# Patient Record
Sex: FEMALE | Race: WHITE
[De-identification: names, ages, dates, MRNs, and addresses within clinical notes are randomized per-mention and may not be internally consistent; named-entity substitution may affect disease eponyms.]

---

## 2019-08-15 ENCOUNTER — HOSPITAL ENCOUNTER (EMERGENCY)
Dept: HOSPITAL 63 - ER | Age: 34
LOS: 1 days | Discharge: HOME | End: 2019-08-16
Payer: OTHER GOVERNMENT

## 2019-08-15 VITALS — HEIGHT: 62 IN | WEIGHT: 115 LBS | BODY MASS INDEX: 21.16 KG/M2

## 2019-08-15 DIAGNOSIS — Z88.2: ICD-10-CM

## 2019-08-15 DIAGNOSIS — K59.00: ICD-10-CM

## 2019-08-15 DIAGNOSIS — Z88.1: ICD-10-CM

## 2019-08-15 DIAGNOSIS — Z87.442: ICD-10-CM

## 2019-08-15 DIAGNOSIS — N39.0: Primary | ICD-10-CM

## 2019-08-15 DIAGNOSIS — Z98.890: ICD-10-CM

## 2019-08-15 DIAGNOSIS — N23: ICD-10-CM

## 2019-08-15 DIAGNOSIS — Z87.440: ICD-10-CM

## 2019-08-15 DIAGNOSIS — Z88.5: ICD-10-CM

## 2019-08-15 LAB
ALBUMIN SERPL-MCNC: 3.6 G/DL (ref 3.4–5)
ALBUMIN/GLOB SERPL: 0.9 {RATIO} (ref 1–1.7)
ALP SERPL-CCNC: 53 U/L (ref 46–116)
ALT SERPL-CCNC: 20 U/L (ref 14–59)
AMPHETAMINE/METHAMPHETAMINE: (no result)
ANION GAP SERPL CALC-SCNC: 6 MMOL/L (ref 6–14)
APTT PPP: YELLOW S
AST SERPL-CCNC: 16 U/L (ref 15–37)
BACTERIA #/AREA URNS HPF: (no result) /HPF
BARBITURATES UR-MCNC: (no result) UG/ML
BASOPHILS # BLD AUTO: 0 X10^3/UL (ref 0–0.2)
BASOPHILS NFR BLD: 0 % (ref 0–3)
BENZODIAZ UR-MCNC: (no result) UG/L
BILIRUB SERPL-MCNC: 0.1 MG/DL (ref 0.2–1)
BILIRUB UR QL STRIP: (no result)
BUN/CREAT SERPL: 16 (ref 6–20)
CA-I SERPL ISE-MCNC: 14 MG/DL (ref 7–20)
CALCIUM SERPL-MCNC: 9 MG/DL (ref 8.5–10.1)
CANNABINOIDS UR-MCNC: (no result) UG/L
CHLORIDE SERPL-SCNC: 104 MMOL/L (ref 98–107)
CO2 SERPL-SCNC: 30 MMOL/L (ref 21–32)
COCAINE UR-MCNC: (no result) NG/ML
CREAT SERPL-MCNC: 0.9 MG/DL (ref 0.6–1)
EOSINOPHIL NFR BLD: 0.1 X10^3/UL (ref 0–0.7)
EOSINOPHIL NFR BLD: 1 % (ref 0–3)
ERYTHROCYTE [DISTWIDTH] IN BLOOD BY AUTOMATED COUNT: 12.4 % (ref 11.5–14.5)
FIBRINOGEN PPP-MCNC: CLEAR MG/DL
GFR SERPLBLD BASED ON 1.73 SQ M-ARVRAT: 72.1 ML/MIN
GLOBULIN SER-MCNC: 4.1 G/DL (ref 2.2–3.8)
GLUCOSE SERPL-MCNC: 118 MG/DL (ref 70–99)
GLUCOSE UR STRIP-MCNC: (no result) MG/DL
HCT VFR BLD CALC: 39.9 % (ref 36–47)
HGB BLD-MCNC: 13.5 G/DL (ref 12–15.5)
LYMPHOCYTES # BLD: 2.6 X10^3/UL (ref 1–4.8)
LYMPHOCYTES NFR BLD AUTO: 47 % (ref 24–48)
MCH RBC QN AUTO: 31 PG (ref 25–35)
MCHC RBC AUTO-ENTMCNC: 34 G/DL (ref 31–37)
MCV RBC AUTO: 91 FL (ref 79–100)
METHADONE SERPL-MCNC: (no result) NG/ML
MONO #: 0.3 X10^3/UL (ref 0–1.1)
MONOCYTES NFR BLD: 6 % (ref 0–9)
NEUT #: 2.6 X10^3UL (ref 1.8–7.7)
NEUTROPHILS NFR BLD AUTO: 46 % (ref 31–73)
NITRITE UR QL STRIP: (no result)
OPIATES UR-MCNC: (no result) NG/ML
PCP SERPL-MCNC: (no result) MG/DL
PLATELET # BLD AUTO: 216 X10^3/UL (ref 140–400)
POTASSIUM SERPL-SCNC: 3.6 MMOL/L (ref 3.5–5.1)
PROT SERPL-MCNC: 7.7 G/DL (ref 6.4–8.2)
RBC # BLD AUTO: 4.39 X10^6/UL (ref 3.5–5.4)
RBC #/AREA URNS HPF: 0 /HPF (ref 0–2)
SODIUM SERPL-SCNC: 140 MMOL/L (ref 136–145)
SP GR UR STRIP: 1.01
SQUAMOUS #/AREA URNS LPF: (no result) /LPF
U PREG PATIENT: NEGATIVE
UROBILINOGEN UR-MCNC: 0.2 MG/DL
WBC # BLD AUTO: 5.6 X10^3/UL (ref 4–11)
WBC #/AREA URNS HPF: (no result) /HPF (ref 0–4)

## 2019-08-15 PROCEDURE — 96365 THER/PROPH/DIAG IV INF INIT: CPT

## 2019-08-15 PROCEDURE — 81025 URINE PREGNANCY TEST: CPT

## 2019-08-15 PROCEDURE — 80307 DRUG TEST PRSMV CHEM ANLYZR: CPT

## 2019-08-15 PROCEDURE — 81001 URINALYSIS AUTO W/SCOPE: CPT

## 2019-08-15 PROCEDURE — 36415 COLL VENOUS BLD VENIPUNCTURE: CPT

## 2019-08-15 PROCEDURE — 74176 CT ABD & PELVIS W/O CONTRAST: CPT

## 2019-08-15 PROCEDURE — 85025 COMPLETE CBC W/AUTO DIFF WBC: CPT

## 2019-08-15 PROCEDURE — 87086 URINE CULTURE/COLONY COUNT: CPT

## 2019-08-15 PROCEDURE — 74022 RADEX COMPL AQT ABD SERIES: CPT

## 2019-08-15 PROCEDURE — 99285 EMERGENCY DEPT VISIT HI MDM: CPT

## 2019-08-15 PROCEDURE — 80053 COMPREHEN METABOLIC PANEL: CPT

## 2019-08-15 NOTE — RAD
Exam: Acute abdominal series

 

INDICATION: Renal colic

 

TECHNIQUE: Frontal view of the chest with upright and supine views of the 

abdomen

 

Comparisons: CT same day

 

FINDINGS:

The cardiomediastinal silhouette and pulmonary vessels are within normal 

limits.

 

The lung and pleural spaces are clear.

 

Air and stool are seen throughout the colon to level of the rectum in a 

nonobstructive bowel gas pattern.

 

No suspicious masses or calcifications.

 

No free air. Visualized osseous structures are unremarkable.

 

IMPRESSION:

1.  No acute cardiopulmonary process.

2.  Nonobstructive bowel gas pattern.

 

Electronically signed by: Madhu Arteaga MD (8/15/2019 11:34 PM) [facility]

## 2019-08-15 NOTE — RAD
Exam: CT abdomen and pelvis without contrast

 

INDICATION: Renal colic

 

TECHNIQUE: Sequential axial images through the abdomen and pelvis obtained

without IV contrast. Sagittal and coronal reformatted images were 

reconstructed from the axial data and reviewed.

 

Comparisons: None

 

FINDINGS:

Heart size is normal. No pericardial effusion. Visualized lung bases are 

clear. No pleural effusion.

 

Evaluation of solid organs is limited secondary to noncontrast technique.

 

Liver, spleen, pancreas, gallbladder and adrenals are unremarkable.

 

No perinephric inflammation or hydronephrosis. Tiny 1 mm nonobstructing 

calculus at the lower pole of left kidney. No ureteral calculi.

 

Bladder is distended and appears thin walled. Uterus is not enlarged. No 

abnormal adnexal mass.

 

Large and small bowel are unremarkable. The appendix is normal. No free 

intraabdominal air fluid. No obstruction.

 

Abdominal aorta has normal course and caliber.

 

No enlarged intra-abdominal lymph nodes are identified.

 

No suspicious osseous lesions or acute fractures.

 

IMPRESSION:

Nonobstructing 1 mm calculus in the lower pole of the left kidney. No 

ureteral calculi or evidence of obstructive uropathy.

 

 

Exposure: One or more of the following in the visualized dose reduction 

techniques were utilized for this examination:

1.  Automated exposure control

2.  Adjustment of the MA and/or KV according to patient size

3.  Use of iterative of reconstructive technique

 

Electronically signed by: Madhu Arteaga MD (8/15/2019 11:23 PM) [facility]

## 2019-08-16 VITALS — SYSTOLIC BLOOD PRESSURE: 137 MMHG | DIASTOLIC BLOOD PRESSURE: 83 MMHG
